# Patient Record
Sex: FEMALE | Race: WHITE | URBAN - METROPOLITAN AREA
[De-identification: names, ages, dates, MRNs, and addresses within clinical notes are randomized per-mention and may not be internally consistent; named-entity substitution may affect disease eponyms.]

---

## 2019-04-05 RX ORDER — NORELGESTROMIN AND ETHINYL ESTRADIOL 150; 35 UG/D; UG/D
PATCH TRANSDERMAL
Qty: 3 PATCH | Refills: 8 | OUTPATIENT
Start: 2019-04-05

## 2019-07-15 RX ORDER — NORELGESTROMIN AND ETHINYL ESTRADIOL 150; 35 UG/D; UG/D
PATCH TRANSDERMAL
Qty: 3 PATCH | Refills: 12 | OUTPATIENT
Start: 2019-07-15

## 2025-07-01 ENCOUNTER — TELEPHONE (OUTPATIENT)
Dept: FAMILY MEDICINE CLINIC | Facility: CLINIC | Age: 25
End: 2025-07-01

## 2025-07-03 ENCOUNTER — TELEPHONE (OUTPATIENT)
Dept: ADMINISTRATIVE | Facility: OTHER | Age: 25
End: 2025-07-03

## 2025-07-03 ENCOUNTER — TELEPHONE (OUTPATIENT)
Age: 25
End: 2025-07-03

## 2025-07-03 ENCOUNTER — OFFICE VISIT (OUTPATIENT)
Dept: FAMILY MEDICINE CLINIC | Facility: CLINIC | Age: 25
End: 2025-07-03
Payer: COMMERCIAL

## 2025-07-03 VITALS
HEIGHT: 60 IN | HEART RATE: 77 BPM | DIASTOLIC BLOOD PRESSURE: 64 MMHG | OXYGEN SATURATION: 96 % | BODY MASS INDEX: 17.91 KG/M2 | SYSTOLIC BLOOD PRESSURE: 110 MMHG | TEMPERATURE: 98 F | WEIGHT: 91.2 LBS

## 2025-07-03 DIAGNOSIS — F31.74 BIPOLAR DISORDER, IN FULL REMISSION, MOST RECENT EPISODE MANIC (HCC): Primary | ICD-10-CM

## 2025-07-03 DIAGNOSIS — R63.6 UNDERWEIGHT (BMI < 18.5): ICD-10-CM

## 2025-07-03 DIAGNOSIS — F41.0 GENERALIZED ANXIETY DISORDER WITH PANIC ATTACKS: ICD-10-CM

## 2025-07-03 DIAGNOSIS — F12.10 MARIJUANA ABUSE: ICD-10-CM

## 2025-07-03 DIAGNOSIS — F41.1 GENERALIZED ANXIETY DISORDER WITH PANIC ATTACKS: ICD-10-CM

## 2025-07-03 PROBLEM — F90.2 ATTENTION DEFICIT HYPERACTIVITY DISORDER (ADHD), COMBINED TYPE: Status: ACTIVE | Noted: 2018-10-18

## 2025-07-03 PROCEDURE — 99204 OFFICE O/P NEW MOD 45 MIN: CPT

## 2025-07-03 RX ORDER — MIRTAZAPINE 7.5 MG/1
7.5 TABLET, FILM COATED ORAL
Qty: 30 TABLET | Refills: 1 | Status: SHIPPED | OUTPATIENT
Start: 2025-07-03

## 2025-07-03 RX ORDER — FERROUS SULFATE 325(65) MG
325 TABLET ORAL
COMMUNITY
End: 2025-07-03

## 2025-07-03 NOTE — ASSESSMENT & PLAN NOTE
Chronic, uncontrolled  Has not been on any medication since adolescent and does not remember what  -Started on Remeron due to also needing to gain weight

## 2025-07-03 NOTE — ASSESSMENT & PLAN NOTE
Chronic, uncontrolled  Diagnosed at age 10  Has not been on any medications since adolescent years  -Patient is currently looking for therapist  -Recommend referral to psych for med management  Orders:  •  Ambulatory referral to Psych Services; Future

## 2025-07-03 NOTE — Clinical Note
Care gap request for annual, done in November. Also submit care gap for pap smear, done April 2024 not due until 2027

## 2025-07-03 NOTE — TELEPHONE ENCOUNTER
Upon review of the In Basket request we were able to locate, review, and update the patient chart as requested for Pap Smear (HPV) aka Cervical Cancer Screening.    Any additional questions or concerns should be emailed to the Practice Liaisons via the appropriate education email address, please do not reply via In Basket.    Thank you  Laurie Lewis MA   [facility]

## 2025-07-03 NOTE — ASSESSMENT & PLAN NOTE
BMI Counseling: Body mass index is 17.79 kg/m². The BMI is below normal. Patient was advised to gain weight. Dietary education for weight gain was provided to the patient today.  Chronic, uncontrolled has just recently been making dietary changes  - Starting on medication for anxiety.  Recommend starting on Remeron because of also help with increased appetite  -Recommend increasing protein and less fast food  -Will follow-up in 1 month  Orders:  •  mirtazapine (REMERON) 7.5 MG tablet; Take 1 tablet (7.5 mg total) by mouth daily at bedtime

## 2025-07-03 NOTE — TELEPHONE ENCOUNTER
Contacted patient in regards to ASAP/STAT Referral. LVM to contact [phone] option 3, to discuss services needed at this time in order to schedule NP appointment.

## 2025-07-03 NOTE — TELEPHONE ENCOUNTER
----- Message from Angie DA SILVA sent at 7/3/2025 10:54 AM EDT -----  Regarding: care gap request  07/03/25 10:54 AM    Hello, our patient attached above has had Pap Smear (HPV) aka Cervical Cancer Screening completed/performed. Please assist in updating the patient chart by pulling the document from labs Tab within Chart Review. The date of service is 4/17/24.     Thank you,  Angie Marie  [facility]

## 2025-07-03 NOTE — ASSESSMENT & PLAN NOTE
Chronic, uncontrolled  Has been smoking for 8 years  Wants to quit today  Wants to just quit [facility]  Aware of the long-term risks

## 2025-07-03 NOTE — PROGRESS NOTES
Name: Neil Wagner      : 2000      MRN: [id number]  Encounter Provider: Dora Treviño PA-C  Encounter Date: 7/3/2025   Encounter department: [facility] PRIMARY CARE  :  Assessment & Plan  Bipolar disorder, in full remission, most recent episode manic (HCC)  Chronic, uncontrolled  Diagnosed at age 10  Has not been on any medications since adolescent years  -Patient is currently looking for therapist  -Recommend referral to psych for med management  Orders:  •  Ambulatory referral to Psych Services; Future    Underweight (BMI < 18.5)  BMI Counseling: Body mass index is 17.79 kg/m². The BMI is below normal. Patient was advised to gain weight. Dietary education for weight gain was provided to the patient today.  Chronic, uncontrolled has just recently been making dietary changes  - Starting on medication for anxiety.  Recommend starting on Remeron because of also help with increased appetite  -Recommend increasing protein and less fast food  -Will follow-up in 1 month  Orders:  •  mirtazapine (REMERON) 7.5 MG tablet; Take 1 tablet (7.5 mg total) by mouth daily at bedtime    Generalized anxiety disorder with panic attacks  Chronic, uncontrolled  Has not been on any medication since adolescent and does not remember what  -Started on Remeron due to also needing to gain weight       Marijuana abuse  Chronic, uncontrolled  Has been smoking for 8 years  Wants to quit today  Wants to just quit cold turkey  Aware of the long-term risks           BMI Counseling: Body mass index is 17.79 kg/m². The BMI is below normal. Patient advised to gain weight and dietary education for weight gain was provided.     Depression Screening and Follow-up Plan: Patient was screened for depression during today's encounter. They screened negative with a PHQ-2 score of 2.        History of Present Illness {?Quick Links Encounters * My Last Note * Last Note in Specialty * Snapshot * Since Last Visit * History  ":32512}  Presenting for mental health concerns  Diagnosed with bipolar age 10 while living In [place]  Was also on birth control at that time  \"Emotional rollercoaster\" during getting off birth control  Felt she is more verbally aggressive   Went to PCP who diagnosed her with \"anxiety\"    Feels \"I'm quick to be nippy\"  Feels increased aggressiveness and mood swings   Feels  then one small thing will happen which \"will make me happy, mad, or sad\"  Was on medication but does not remember what- 7 years old until 13 years old   Online therapy once or twice but never had regular sessions      In moments when anxius, heart pounding  \"During episodes\" feels like it's hard to sleep  Feels like her head ruminates  Can be crying or tearful  Sometimes guilt  No appetite, cravings for sugar     Smokes marijuana daily for the past 8 years  + paranoid thoughts of people in her life leaving  Denies hearing or seeing things    Currently lives with boyfriend for the past 8 years      Review of Systems   Respiratory:  Negative for shortness of breath.    Cardiovascular:  Positive for palpitations. Negative for chest pain.   Psychiatric/Behavioral:  Positive for agitation and decreased concentration. Negative for hallucinations, self-injury, sleep disturbance and suicidal ideas. The patient is nervous/anxious. The patient is not hyperactive.        Objective {?Quick Links Trend Vitals * Enter New Vitals * Results Review * Timeline (Adult) * Labs * Imaging * Cardiology * Procedures * Lung Cancer Screening * Surgical eConsent :98670}  /64 (BP Location: Left arm, Patient Position: Sitting, Cuff Size: Standard)   Pulse 77   Temp 98 °F (36.7 °C) (Temporal)   Ht 5' 0.04\" (1.525 m)   Wt 41.4 kg (91 lb 3.2 oz)   SpO2 96%   BMI 17.79 kg/m²      Physical Exam  Vitals and nursing note reviewed.   Constitutional:       General: She is not in acute distress.     Appearance: She is well-developed.   HENT:      Head: Normocephalic " and atraumatic.     Eyes:      Conjunctiva/sclera: Conjunctivae normal.       Cardiovascular:      Rate and Rhythm: Normal rate and regular rhythm.      Heart sounds: No murmur heard.  Pulmonary:      Effort: Pulmonary effort is normal. No respiratory distress.      Breath sounds: Normal breath sounds.     Musculoskeletal:         General: No swelling.      Cervical back: Neck supple.     Skin:     General: Skin is warm and dry.      Capillary Refill: Capillary refill takes less than 2 seconds.     Neurological:      Mental Status: She is alert.     Psychiatric:         Attention and Perception: Attention normal. She does not perceive auditory or visual hallucinations.         Mood and Affect: Affect normal. Mood is anxious.         Speech: Speech normal.         Behavior: Behavior normal. Behavior is not agitated, slowed, aggressive or withdrawn. Behavior is cooperative.         Thought Content: Thought content is paranoid. Thought content is not delusional. Thought content does not include homicidal or suicidal ideation. Thought content does not include homicidal or suicidal plan.         Cognition and Memory: Cognition normal.

## 2025-07-03 NOTE — PROGRESS NOTES
Adult Annual Physical  Name: Neil Wagner      : 2000      MRN: [id number]  Encounter Provider: Dora Treviño PA-C  Encounter Date: 7/3/2025   Encounter department: [facility] PRIMARY [facility]ARE    :  Assessment & Plan            Immunizations:  - Immunizations due: Hepatitis A         History of Present Illness     Adult Annual Physical:  Patient presents for annual physical.     Diet and Physical Activity:  - Diet/Nutrition: no special diet.  - Exercise: walking, strength training exercises, moderate cardiovascular exercise, 5-7 times a week on average and 30-60 minutes on average.    Depression Screening:  - PHQ-2 Score: 2    General Health:  - Sleep: sleeps well and 7-8 hours of sleep on average.  - Hearing: decreased hearing right ear and normal hearing left ear.  - Vision: wears glasses and most recent eye exam < 1 year ago.  - Dental: regular dental visits, brushes teeth twice daily and floss regularly.    /GYN Health:  - Follows with GYN: yes.   - Last menstrual cycle: 2025.   - History of STDs: no    Advanced Care Planning:  - Has an advanced directive?: no    - Has a durable medical POA?: no      Review of Systems      Objective   There were no vitals taken for this visit.    Physical Exam

## 2025-07-07 NOTE — TELEPHONE ENCOUNTER
Contacted patient in regards to ASAP/STAT Referral. LVM to contact [phone] option 3, to discuss services needed at this time in order to schedule NP appointment.

## 2025-07-08 NOTE — TELEPHONE ENCOUNTER
Contacted patient in regards to ASAP/STAT Referral. LVM to contact [phone] option 3, to discuss services needed at this time in order to schedule NP appointment.

## 2025-08-09 ENCOUNTER — PATIENT MESSAGE (OUTPATIENT)
Dept: FAMILY MEDICINE CLINIC | Facility: CLINIC | Age: 25
End: 2025-08-09